# Patient Record
Sex: MALE | HISPANIC OR LATINO | URBAN - NONMETROPOLITAN AREA
[De-identification: names, ages, dates, MRNs, and addresses within clinical notes are randomized per-mention and may not be internally consistent; named-entity substitution may affect disease eponyms.]

---

## 2019-05-21 ENCOUNTER — OFFICE VISIT (OUTPATIENT)
Dept: URBAN - NONMETROPOLITAN AREA CLINIC 9 | Facility: CLINIC | Age: 84
End: 2019-05-21
Payer: MEDICARE

## 2019-05-21 PROCEDURE — 92014 COMPRE OPH EXAM EST PT 1/>: CPT | Performed by: OPHTHALMOLOGY

## 2019-05-21 ASSESSMENT — VISUAL ACUITY
OD: 20/25
OS: 20/150

## 2019-05-21 ASSESSMENT — INTRAOCULAR PRESSURE
OD: 13
OS: 12

## 2019-05-21 ASSESSMENT — KERATOMETRY
OD: 45.00
OS: 45.88

## 2019-05-21 NOTE — IMPRESSION/PLAN
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  Plan: Keep appt with Dr. Riri Jhaveri

## 2020-11-17 ENCOUNTER — OFFICE VISIT (OUTPATIENT)
Dept: URBAN - NONMETROPOLITAN AREA CLINIC 9 | Facility: CLINIC | Age: 85
End: 2020-11-17
Payer: MEDICARE

## 2020-11-17 DIAGNOSIS — H04.123 DRY EYE SYNDROME OF BILATERAL LACRIMAL GLANDS: ICD-10-CM

## 2020-11-17 DIAGNOSIS — H26.492 OTHER SECONDARY CATARACT, LEFT EYE: Primary | ICD-10-CM

## 2020-11-17 DIAGNOSIS — Z96.1 PRESENCE OF PSEUDOPHAKIA: ICD-10-CM

## 2020-11-17 DIAGNOSIS — H35.3231 BILATERAL EXUDATIVE AGE-RELATED MACULAR DEGENERATION WITH ACTIVE CHOROIDAL NEOVASCULARIZATION: ICD-10-CM

## 2020-11-17 PROCEDURE — 92004 COMPRE OPH EXAM NEW PT 1/>: CPT | Performed by: OPTOMETRIST

## 2020-11-17 PROCEDURE — 92014 COMPRE OPH EXAM EST PT 1/>: CPT | Performed by: OPTOMETRIST

## 2020-11-17 ASSESSMENT — VISUAL ACUITY: OD: 20/30

## 2020-11-17 ASSESSMENT — INTRAOCULAR PRESSURE
OD: 14
OS: 13

## 2020-11-17 NOTE — IMPRESSION/PLAN
Impression: Other secondary cataract, left eye: H26.492. Plan: Discuss diagnosis and treatment with patient. Pt. refered to Dr. Radha Flores for yag consult.

## 2020-11-17 NOTE — IMPRESSION/PLAN
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  Plan: Continue care with Dr. Sherrie Chen

## 2021-01-28 ENCOUNTER — OFFICE VISIT (OUTPATIENT)
Dept: URBAN - NONMETROPOLITAN AREA CLINIC 9 | Facility: CLINIC | Age: 86
End: 2021-01-28
Payer: MEDICARE

## 2021-01-28 PROCEDURE — 99213 OFFICE O/P EST LOW 20 MIN: CPT | Performed by: OPHTHALMOLOGY

## 2021-01-28 ASSESSMENT — INTRAOCULAR PRESSURE
OD: 15
OS: 14

## 2021-01-28 ASSESSMENT — KERATOMETRY
OS: 45.25
OD: 45.00

## 2021-01-28 NOTE — IMPRESSION/PLAN
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 Plan: Continue care with Dr. Sanjuanita Blankenship. Observe.

## 2021-01-28 NOTE — IMPRESSION/PLAN
Impression: Other secondary cataract, left eye: H26.492. Plan: Capsular opacification accounts for symptoms. Pt elects sx. Schedule YAG capsulotomy, left eye. Discussed R/B/A. RL2. Guarded prognosis due to retina disease.

## 2021-02-03 ENCOUNTER — SURGERY (OUTPATIENT)
Dept: URBAN - METROPOLITAN AREA SURGERY 40 | Facility: SURGERY | Age: 86
End: 2021-02-03
Payer: MEDICARE

## 2021-02-03 PROCEDURE — 66821 AFTER CATARACT LASER SURGERY: CPT | Performed by: OPHTHALMOLOGY

## 2021-02-10 ENCOUNTER — Encounter (OUTPATIENT)
Dept: URBAN - METROPOLITAN AREA SURGERY 39 | Facility: SURGERY | Age: 86
End: 2021-02-10
Payer: MEDICARE

## 2024-02-23 ENCOUNTER — OFFICE VISIT (OUTPATIENT)
Dept: URBAN - NONMETROPOLITAN AREA CLINIC 8 | Facility: CLINIC | Age: 89
End: 2024-02-23
Payer: MEDICARE

## 2024-02-23 DIAGNOSIS — H35.3212 EXDTVE AGE-REL MCLR DEGN, RIGHT EYE, WITH INACT CHRDL NEOVAS: Primary | ICD-10-CM

## 2024-02-23 DIAGNOSIS — H04.123 DRY EYE SYNDROME OF BILATERAL LACRIMAL GLANDS: ICD-10-CM

## 2024-02-23 DIAGNOSIS — H35.371 PUCKERING OF MACULA, RIGHT EYE: ICD-10-CM

## 2024-02-23 DIAGNOSIS — Z96.1 PRESENCE OF INTRAOCULAR LENS: ICD-10-CM

## 2024-02-23 DIAGNOSIS — H35.3223 EXUDATIVE AGE-REL MCLR DEGN, LEFT EYE, WITH INACTIVE SCAR: ICD-10-CM

## 2024-02-23 PROCEDURE — 92004 COMPRE OPH EXAM NEW PT 1/>: CPT | Performed by: OPTOMETRIST

## 2024-02-23 ASSESSMENT — INTRAOCULAR PRESSURE
OD: 15
OS: 16

## 2024-02-23 ASSESSMENT — KERATOMETRY
OD: 45.63
OS: 45.50

## 2025-06-13 ENCOUNTER — OFFICE VISIT (OUTPATIENT)
Dept: URBAN - NONMETROPOLITAN AREA CLINIC 8 | Facility: CLINIC | Age: 89
End: 2025-06-13
Payer: MEDICARE

## 2025-06-13 DIAGNOSIS — H35.3231 EXUDATIVE MACULAR DEGENERATION, WITH ACTIVE CHOROIDAL NEOVASCULARIZATION, BILATERAL: Primary | ICD-10-CM

## 2025-06-13 DIAGNOSIS — H16.223 KERATOCONJUNCT SICCA, NOT SPECIFIED AS SJOGREN'S, BILATERAL: ICD-10-CM

## 2025-06-13 DIAGNOSIS — Z96.1 PRESENCE OF INTRAOCULAR LENS: ICD-10-CM

## 2025-06-13 DIAGNOSIS — H35.371 PUCKERING OF MACULA, RIGHT EYE: ICD-10-CM

## 2025-06-13 PROCEDURE — 92014 COMPRE OPH EXAM EST PT 1/>: CPT | Performed by: OPTOMETRIST

## 2025-06-13 ASSESSMENT — INTRAOCULAR PRESSURE
OD: 10
OS: 11

## 2025-06-13 ASSESSMENT — KERATOMETRY
OD: 45.50
OS: 45.75